# Patient Record
Sex: MALE | Race: OTHER | NOT HISPANIC OR LATINO | URBAN - METROPOLITAN AREA
[De-identification: names, ages, dates, MRNs, and addresses within clinical notes are randomized per-mention and may not be internally consistent; named-entity substitution may affect disease eponyms.]

---

## 2017-04-25 ENCOUNTER — OUTPATIENT (OUTPATIENT)
Dept: OUTPATIENT SERVICES | Facility: HOSPITAL | Age: 57
LOS: 1 days | Discharge: HOME | End: 2017-04-25

## 2017-06-28 DIAGNOSIS — J01.00 ACUTE MAXILLARY SINUSITIS, UNSPECIFIED: ICD-10-CM

## 2017-06-28 DIAGNOSIS — R53.83 OTHER FATIGUE: ICD-10-CM

## 2017-11-08 ENCOUNTER — OUTPATIENT (OUTPATIENT)
Dept: OUTPATIENT SERVICES | Facility: HOSPITAL | Age: 57
LOS: 1 days | Discharge: HOME | End: 2017-11-08

## 2017-11-08 DIAGNOSIS — G93.3 POSTVIRAL AND RELATED FATIGUE SYNDROMES: ICD-10-CM

## 2017-11-08 DIAGNOSIS — D53.9 NUTRITIONAL ANEMIA, UNSPECIFIED: ICD-10-CM

## 2019-07-29 ENCOUNTER — OUTPATIENT (OUTPATIENT)
Dept: OUTPATIENT SERVICES | Facility: HOSPITAL | Age: 59
LOS: 1 days | Discharge: HOME | End: 2019-07-29

## 2019-07-29 DIAGNOSIS — E78.5 HYPERLIPIDEMIA, UNSPECIFIED: ICD-10-CM

## 2019-07-29 DIAGNOSIS — D64.9 ANEMIA, UNSPECIFIED: ICD-10-CM

## 2022-05-23 ENCOUNTER — FORM ENCOUNTER (OUTPATIENT)
Age: 62
End: 2022-05-23

## 2022-05-23 ENCOUNTER — LABORATORY RESULT (OUTPATIENT)
Age: 62
End: 2022-05-23

## 2022-05-23 ENCOUNTER — APPOINTMENT (OUTPATIENT)
Dept: OTHER | Facility: CLINIC | Age: 62
End: 2022-05-23
Payer: COMMERCIAL

## 2022-05-23 VITALS
DIASTOLIC BLOOD PRESSURE: 95 MMHG | BODY MASS INDEX: 32.51 KG/M2 | WEIGHT: 240 LBS | SYSTOLIC BLOOD PRESSURE: 137 MMHG | HEART RATE: 63 BPM | OXYGEN SATURATION: 100 % | TEMPERATURE: 98.1 F | HEIGHT: 72 IN

## 2022-05-23 DIAGNOSIS — Z12.2 ENCOUNTER FOR SCREENING FOR MALIGNANT NEOPLASM OF RESPIRATORY ORGANS: ICD-10-CM

## 2022-05-23 DIAGNOSIS — Z03.89 ENCOUNTER FOR OBSERVATION FOR OTHER SUSPECTED DISEASES AND CONDITIONS RULED OUT: ICD-10-CM

## 2022-05-23 DIAGNOSIS — Z23 ENCOUNTER FOR IMMUNIZATION: ICD-10-CM

## 2022-05-23 PROBLEM — Z00.00 ENCOUNTER FOR PREVENTIVE HEALTH EXAMINATION: Status: ACTIVE | Noted: 2022-05-23

## 2022-05-23 PROCEDURE — 99386 PREV VISIT NEW AGE 40-64: CPT | Mod: 25

## 2022-05-23 RX ORDER — PANTOPRAZOLE 40 MG/1
40 TABLET, DELAYED RELEASE ORAL
Qty: 30 | Refills: 0 | Status: ACTIVE | COMMUNITY
Start: 2022-05-12

## 2022-05-24 ENCOUNTER — NON-APPOINTMENT (OUTPATIENT)
Age: 62
End: 2022-05-24

## 2022-05-24 LAB
ALBUMIN SERPL ELPH-MCNC: 4.6 G/DL
ALP BLD-CCNC: 105 U/L
ALT SERPL-CCNC: 16 U/L
ANION GAP SERPL CALC-SCNC: 11 MMOL/L
APPEARANCE: CLEAR
AST SERPL-CCNC: 19 U/L
BACTERIA: NEGATIVE
BASOPHILS # BLD AUTO: 0.06 K/UL
BASOPHILS NFR BLD AUTO: 0.8 %
BILIRUB SERPL-MCNC: 0.5 MG/DL
BILIRUBIN URINE: NEGATIVE
BLOOD URINE: NEGATIVE
BUN SERPL-MCNC: 20 MG/DL
CALCIUM SERPL-MCNC: 9.7 MG/DL
CHLORIDE SERPL-SCNC: 104 MMOL/L
CHOLEST SERPL-MCNC: 160 MG/DL
CO2 SERPL-SCNC: 25 MMOL/L
COLOR: YELLOW
CREAT SERPL-MCNC: 0.78 MG/DL
EGFR: 101 ML/MIN/1.73M2
EOSINOPHIL # BLD AUTO: 0.16 K/UL
EOSINOPHIL NFR BLD AUTO: 2.2 %
GLUCOSE QUALITATIVE U: NEGATIVE
GLUCOSE SERPL-MCNC: 84 MG/DL
HCT VFR BLD CALC: 39.1 %
HDLC SERPL-MCNC: 66 MG/DL
HGB BLD-MCNC: 10.7 G/DL
HYALINE CASTS: 2 /LPF
IMM GRANULOCYTES NFR BLD AUTO: 0.3 %
KETONES URINE: NEGATIVE
LDLC SERPL CALC-MCNC: 76 MG/DL
LEUKOCYTE ESTERASE URINE: NEGATIVE
LYMPHOCYTES # BLD AUTO: 1.62 K/UL
LYMPHOCYTES NFR BLD AUTO: 22.1 %
MAN DIFF?: NORMAL
MCHC RBC-ENTMCNC: 21.1 PG
MCHC RBC-ENTMCNC: 27.4 GM/DL
MCV RBC AUTO: 77.3 FL
MICROSCOPIC-UA: NORMAL
MONOCYTES # BLD AUTO: 0.61 K/UL
MONOCYTES NFR BLD AUTO: 8.3 %
NEUTROPHILS # BLD AUTO: 4.85 K/UL
NEUTROPHILS NFR BLD AUTO: 66.3 %
NITRITE URINE: NEGATIVE
NONHDLC SERPL-MCNC: 94 MG/DL
PH URINE: 6
PLATELET # BLD AUTO: 371 K/UL
POTASSIUM SERPL-SCNC: 5.3 MMOL/L
PROT SERPL-MCNC: 7.3 G/DL
PROTEIN URINE: NORMAL
RBC # BLD: 5.06 M/UL
RBC # FLD: 22.5 %
RED BLOOD CELLS URINE: 4 /HPF
SODIUM SERPL-SCNC: 141 MMOL/L
SPECIFIC GRAVITY URINE: 1.03
SQUAMOUS EPITHELIAL CELLS: 1 /HPF
TRIGL SERPL-MCNC: 93 MG/DL
UROBILINOGEN URINE: NORMAL
WBC # FLD AUTO: 7.32 K/UL
WHITE BLOOD CELLS URINE: 1 /HPF

## 2022-05-26 ENCOUNTER — NON-APPOINTMENT (OUTPATIENT)
Age: 62
End: 2022-05-26

## 2022-05-26 DIAGNOSIS — F17.210 NICOTINE DEPENDENCE, CIGARETTES, UNCOMPLICATED: ICD-10-CM

## 2022-05-28 VITALS — BODY MASS INDEX: 32.51 KG/M2 | HEIGHT: 72 IN | WEIGHT: 240 LBS

## 2022-05-28 PROBLEM — F17.210 CIGARETTE SMOKER: Status: ACTIVE | Noted: 2022-05-23

## 2022-05-28 NOTE — HISTORY OF PRESENT ILLNESS
[Current] : current smoker [_____ pack-years] : [unfilled] pack-years [TextBox_13] : Referred by Dr. Chin\par \par Mr. EUSEBIO FREDERICK is a 61 year old man with a history of nicotine dependence\par \par He was seen in the office by Dr. Chin for review of eligibility for, as well as, discussion of Low-Dose CT lung cancer screening program. Over the telephone today we reviewed and confirmed that the patient meets screening eligibility criteria:\par \par -Age: 61 years old\par \par Smoking status:\par \par -Current smoker\par \par -Number of pack(s) per day: .75\par \par -Number of years smoked:37\par \par -Number of pack years smokin.75\par \par Mr. FREDERICK denies any signs or symptoms of lung cancer including new cough, change in cough, hemoptysis and unintentional weight loss.\par \par Mr. FREDERICK denies any personal history of lung cancer. No lung cancer in a 1st degree relative. Denies any history of lung disease. Denies any history of occupational exposures\par  [TextBox_6] : .75 [TextBox_8] : 37

## 2022-05-28 NOTE — PLAN
[Smoking Cessation Guidance Provided] : Smoking cessation guidance was provided to patient [Smoking Cessation] : smoking cessation [Age appropriate screenings] : Age appropriate screenings [Regular follow-up with healthcare provider] : regular follow-up with healthcare provider [FreeTextEntry1] : Plan:\par \par -Low Dose CT chest for lung cancer screening scheduled for 6/2/22 at [facility]\par \par -Patient to follow up with Dr. Chin to review results of LDCT\par \par -Encouraged smoking cessation\par \par \par Engaged in shared decision making with Mr. EUSEBIO FREDERICK . Answered all questions. He verbalized understanding and agreement. He knows to call back with any questions or concerns\par

## 2022-05-28 NOTE — REASON FOR VISIT
[Home] : at home, [unfilled] , at the time of the visit. [Medical Office: ([facility])___] : at the medical office located in  [Verbal consent obtained from patient] : the patient, [unfilled] [Initial Evaluation] : an initial evaluation visit [Review of Eligibility] : review of eligibility [Low-Dose CT Screening Discussion] : low-dose CT lung cancer screening discussion [Virtual Visit] : virtual visit

## 2022-06-02 ENCOUNTER — OUTPATIENT (OUTPATIENT)
Dept: OUTPATIENT SERVICES | Facility: HOSPITAL | Age: 62
LOS: 1 days | End: 2022-06-02
Payer: COMMERCIAL

## 2022-06-02 ENCOUNTER — APPOINTMENT (OUTPATIENT)
Dept: CT IMAGING | Facility: IMAGING CENTER | Age: 62
End: 2022-06-02
Payer: COMMERCIAL

## 2022-06-02 DIAGNOSIS — Z12.2 ENCOUNTER FOR SCREENING FOR MALIGNANT NEOPLASM OF RESPIRATORY ORGANS: ICD-10-CM

## 2022-06-02 PROCEDURE — 71271 CT THORAX LUNG CANCER SCR C-: CPT | Mod: 26

## 2022-06-02 PROCEDURE — 71271 CT THORAX LUNG CANCER SCR C-: CPT

## 2022-06-03 PROBLEM — Z00.00 ENCOUNTER FOR PREVENTIVE HEALTH EXAMINATION: Noted: 2022-06-03

## 2022-06-07 VITALS — BODY MASS INDEX: 32.51 KG/M2 | WEIGHT: 240 LBS | HEIGHT: 72 IN

## 2022-06-08 ENCOUNTER — APPOINTMENT (OUTPATIENT)
Dept: UROLOGY | Facility: CLINIC | Age: 62
End: 2022-06-08
Payer: COMMERCIAL

## 2022-06-08 DIAGNOSIS — Z80.42 FAMILY HISTORY OF MALIGNANT NEOPLASM OF PROSTATE: ICD-10-CM

## 2022-06-08 DIAGNOSIS — Z80.0 FAMILY HISTORY OF MALIGNANT NEOPLASM OF DIGESTIVE ORGANS: ICD-10-CM

## 2022-06-08 DIAGNOSIS — Z78.9 OTHER SPECIFIED HEALTH STATUS: ICD-10-CM

## 2022-06-08 DIAGNOSIS — C61 MALIGNANT NEOPLASM OF PROSTATE: ICD-10-CM

## 2022-06-08 PROCEDURE — 99204 OFFICE O/P NEW MOD 45 MIN: CPT | Mod: 95

## 2022-06-08 NOTE — DISEASE MANAGEMENT
[I] : I [Active Surveillance] : Active Surveillance [Biopsy] : Patient had a biopsy on [6] : Template Biopsy Tomas Score: 6 [BiopsyDate] : 06/04/2021 [MeasuredProstateVolume] : 23 [TotalCores] : 14 [TotalPositiveCores] : 3 [MaxCoreInvolvement] : 35 [LastPSADate] : 5/10/22 [LastPSAResults] : 1.46 ng/ml [LastMRIDate] : 05/17/22 [LastMRIResults] : 23 cc prostate with PIRADS 4 lesion to the right posterolateral midgland peripheral zone measuring 7 x 4 mm (image 16 of series 8). [FirstSurveillanceBiopsyDate] : 06/04/21 [FirstSurveillanceBiopsyResults] : Right medial apex- [place] 6 (3+3) 5% of tissue, 1 mm \par Left medial apex- [place] 6 (3+3) 35% of tissue, 4 mm\par Left anterior transition zone midgland- Tomas 6 (3+3) 2% of tissue, 1 mm \par Left medial base- atypical small acinar proliferation

## 2022-06-08 NOTE — PHYSICAL EXAM
[Oriented To Time, Place, And Person] : oriented to person, place, and time [FreeTextEntry1] : 61 year old male with a PMHx of CaP on AS who presents for prostate biopsy consult. A biopsy done on 06/04/2021 detected GG1 [place] 6 CaP in 3/14 cores. His PSA at diagnosis was 1.33 ng/ml. His current PSA is 1.46 ng/ml and his PSAD is normal (0.06 ng/ml/cc). An MRI done on 05/17/2022 demonstrated a PIRADS 4 lesion to the right posterolateral midgland peripheral zone. He has not had any other prostate biopsies. Family history of prostate cancer in his father- was treated with radiation therapy and he did not die from prostate cancer. \par \par 1. Obtain MRI images and report from 03/03/2021. \par \par He understands that many men with prostate cancer will die with the disease rather than of it and we also discussed the results large multi-center American and  prostate cancer screening trials. He also understands that PSA in and of itself does not diagnose prostate cancer but only assesses risk to a certain degree. The patient understands that to definitively screen for prostate cancer, a biopsy is required and this procedure has risks, including bleeding, infection, ED and urinary retention. The patient opted to move forward with the biopsy.\par \par The patient is aware to expect hematuria x 2 weeks and up to 4 weeks of hematospermia.  There is a risk of infection albeit much lower than a transrectal approach. In some cases, patients can experience erectile dysfunction but this is usually self limiting.  Any fever/chills after the biopsy, the patient is to contact the office and go to the ER for an immediate evaluation. He has been given paper instructions outlining these items - which includes medications to avoid prior to surgery.\par \par  \par 1. CBC, BMP, PSA, Covid Test, UA UCx, EKG. He had a CT Chest done last week- given current smoking status, he can submit these results for medical clearance in lieu of a CXR.\par 2. Medical Clearance\par 3. TP biopsy at [facility] with Dr. Casiano\par 4. Follow up 2 weeks after biopsy with his primary urologist or ourselves.\par 5. We will call with the path results once they are resulted.\par \par Follow up in office for MRI review/physical exam prior to biopsy date given.\par \par Annabel Son NP was present and available for consult for the entirety of this visit.\par

## 2022-06-08 NOTE — PHYSICAL EXAM
[Oriented To Time, Place, And Person] : oriented to person, place, and time [FreeTextEntry1] : 61 year old male with a PMHx of CaP on AS who presents for prostate biopsy consult. A biopsy done on 06/04/2021 detected GG1 [place] 6 CaP in 3/14 cores. His PSA at diagnosis was 1.33 ng/ml. His current PSA is 1.46 ng/ml and his PSAD is normal (0.06 ng/ml/cc). An MRI done on 05/17/2022 demonstrated a PIRADS 4 lesion to the right posterolateral midgland peripheral zone. He has not had any other prostate biopsies. Family history of prostate cancer in his father- was treated with radiation therapy and he did not die from prostate cancer. \par \par 1. Obtain MRI images and report from 03/03/2021. \par \par He understands that many men with prostate cancer will die with the disease rather than of it and we also discussed the results large multi-center American and  prostate cancer screening trials. He also understands that PSA in and of itself does not diagnose prostate cancer but only assesses risk to a certain degree. The patient understands that to definitively screen for prostate cancer, a biopsy is required and this procedure has risks, including bleeding, infection, ED and urinary retention. The patient opted to move forward with the biopsy.\par \par The patient is aware to expect hematuria x 2 weeks and up to 4 weeks of hematospermia.  There is a risk of infection albeit much lower than a transrectal approach. In some cases, patients can experience erectile dysfunction but this is usually self limiting.  Any fever/chills after the biopsy, the patient is to contact the office and go to the ER for an immediate evaluation. He has been given paper instructions outlining these items - which includes medications to avoid prior to surgery.\par \par  \par 1. CBC, BMP, PSA, Covid Test, UA UCx, EKG. He had a CT Chest done last week- given current smoking status, he can submit these results for medical clearance in lieu of a CXR.\par 2. Medical Clearance\par 3. TP biopsy at [facility] with Dr. Casiano\par 4. Follow up 2 weeks after biopsy with his primary urologist or ourselves.\par 5. We will call with the path results once they are resulted.\par \par Follow up in office for MRI review/physical exam prior to biopsy date given.\par \par Annabel Son NP was present and available for consult for the entirety of this visit.\par

## 2022-06-08 NOTE — HISTORY OF PRESENT ILLNESS
[FreeTextEntry1] : AMWELL FAILED\par SALVAGED BY TELEPHONE\par \par Dear Dr. Portillo (Urologist)\par Dear Dr. Ramon (Radiation Oncologist)\par Dear Dr. Rahul Currie (PCP)\par \par Thank you so much for the referral to help care for your patient.\par \par Chief Complaint: Prostate Cancer\par Date of first visit: 06/08/2022\par Occupation: \par \par EUSEBIO FREDERICK  is a 61 year old male with a PMHx of CaP on AS who presents for prostate biopsy consult. A biopsy done on 06/04/2021 detected GG1 [place] 6 CaP in 3/14 cores. His PSA at diagnosis was 1.33 ng/ml. His current PSA is 1.46 ng/ml and his PSAD is normal (0.06 ng/ml/cc). An MRI done on 05/17/2022 demonstrated a PIRADS 4 lesion to the right posterolateral midgland peripheral zone. He has not had any other prostate biopsies. His father was diagnosed with prostate cancer in his 70s and was treated with radiation therapy--he did not die from prostate cancer. \par \par He has no urinary complaints at this time. \par \par PSA History\par 1.46 ng/ml 05/10/22\par 1.33 ng/ml 02/08/22\par \par MRI History\par MRI 05/17/2022.  23 cc prostate with PIRADS 4 lesion to the right posterolateral midgland peripheral zone measuring 7 x 4 mm (image 16 of series 8).  No LAD No EPE, No Bony Lesions.  The clinical implications discussed with the patient.\par \par MRI 03/03/2021(per chart review)  \par PIRADS 3 lesion to the left anterior transition zone at the midgland\par PIRADS 3 lesion measuring 0.8 cm to the left anterolateral peripheral zone.  No LAD No EPE, No Bony Lesions.  The images have been reviewed and clinical implications discussed with the patient.\par \par Biopsy History \par 06/04/2021 at [facility] w/ Dr. Watson\par Right medial apex- [place] 6 (3+3) 5% of tissue, 1 mm \par Left medial apex- [place] 6 (3+3) 35% of tissue, 4 mm\par Left anterior transition zone midgland- Tomas 6 (3+3) 2% of tissue, 1 mm \par Left medial base- atypical small acinar proliferation\par \par The patient denies fevers, chills, nausea and or vomiting and no unexplained weight loss.\par \par All pertinent laboratory results and physician notes were reviewed.  Questionnaire results were discussed with patient.\par \par 06/08/2022\par IPSS 1 QOL 0\par JOSELYN 22

## 2022-06-08 NOTE — HISTORY OF PRESENT ILLNESS
[FreeTextEntry1] : AMWELL FAILED\par SALVAGED BY TELEPHONE\par \par Dear Dr. Portillo (Urologist)\par Dear Dr. Ramon (Radiation Oncologist)\par Dear Dr. Rahul Currie (PCP)\par \par Thank you so much for the referral to help care for your patient.\par \par Chief Complaint: Prostate Cancer\par Date of first visit: 06/08/2022\par Occupation: \par \par EUSEBIO FREDERICK  is a 61 year old male with a PMHx of CaP on AS who presents for prostate biopsy consult. A biopsy done on 06/04/2021 detected GG1 [place] 6 CaP in 3/14 cores. His PSA at diagnosis was 1.33 ng/ml. His current PSA is 1.46 ng/ml and his PSAD is normal (0.06 ng/ml/cc). An MRI done on 05/17/2022 demonstrated a PIRADS 4 lesion to the right posterolateral midgland peripheral zone. He has not had any other prostate biopsies. His father was diagnosed with prostate cancer in his 70s and was treated with radiation therapy--he did not die from prostate cancer. \par \par He has no urinary complaints at this time. \par \par PSA History\par 1.46 ng/ml 05/10/22\par 1.33 ng/ml 02/08/22\par \par MRI History\par MRI 05/17/2022.  23 cc prostate with PIRADS 4 lesion to the right posterolateral midgland peripheral zone measuring 7 x 4 mm (image 16 of series 8).  No LAD No EPE, No Bony Lesions.  The clinical implications discussed with the patient.\par \par MRI 03/03/2021(per chart review)  \par PIRADS 3 lesion to the left anterior transition zone at the midgland\par PIRADS 3 lesion measuring 0.8 cm to the left anterolateral peripheral zone.  No LAD No EPE, No Bony Lesions.  The images have been reviewed and clinical implications discussed with the patient.\par \par Biopsy History \par 06/04/2021 at [facility] w/ Dr. Watson\par Right medial apex- [place] 6 (3+3) 5% of tissue, 1 mm \par Left medial apex- [place] 6 (3+3) 35% of tissue, 4 mm\par Left anterior transition zone midgland- [place] 6 (3+3) 2% of tissue, 1 mm \par Left medial base- atypical small acinar proliferation\par \par The patient denies fevers, chills, nausea and or vomiting and no unexplained weight loss.\par \par All pertinent laboratory results and physician notes were reviewed.  Questionnaire results were discussed with patient.\par \par 06/08/2022\par IPSS 1 QOL 0\par JOSELYN 22

## 2022-06-14 ENCOUNTER — FORM ENCOUNTER (OUTPATIENT)
Age: 62
End: 2022-06-14

## 2022-06-21 ENCOUNTER — APPOINTMENT (OUTPATIENT)
Dept: UROLOGY | Facility: CLINIC | Age: 62
End: 2022-06-21

## 2022-06-21 ENCOUNTER — APPOINTMENT (OUTPATIENT)
Dept: OTHER | Facility: CLINIC | Age: 62
End: 2022-06-21

## 2022-06-21 ENCOUNTER — APPOINTMENT (OUTPATIENT)
Dept: OTHER | Facility: CLINIC | Age: 62
End: 2022-06-21
Payer: COMMERCIAL

## 2022-06-21 VITALS
TEMPERATURE: 98 F | DIASTOLIC BLOOD PRESSURE: 96 MMHG | SYSTOLIC BLOOD PRESSURE: 159 MMHG | HEART RATE: 66 BPM | OXYGEN SATURATION: 100 %

## 2022-06-21 PROCEDURE — 99214 OFFICE O/P EST MOD 30 MIN: CPT

## 2022-06-21 PROCEDURE — 99441: CPT | Mod: 95

## 2022-06-24 ENCOUNTER — NON-APPOINTMENT (OUTPATIENT)
Age: 62
End: 2022-06-24

## 2022-06-24 LAB
BACTERIA UR CULT: NORMAL
SARS-COV-2 N GENE NPH QL NAA+PROBE: NOT DETECTED

## 2022-06-24 NOTE — HISTORY OF PRESENT ILLNESS
[FreeTextEntry1] : Language: English\par Date of First visit: \par Accompanied by: Self\par Contact info: \par Referring Provider/PCP: \par Dr. Portillo (Urologist)\par Dear Dr. Ramon (Radiation Oncologist)\par Dear Dr. Rahul Currie (PCP)\par \par \par \par CC/ Problem List:\par \par ===============================================================================\par FIRST VISIT:\par The patient is a 61 year male who first presents 06/21/2022 for RDP prostate biopsy while on AS for prostate cancer\par \par Diagnosis: \par Biopsy: Patient had a biopsy on 06/04/2021. Measured Prostate Volume (mL): 23. Total number of cores at biopsy: 14. Total # of positives cores: 3. Max % Core Involvement: 35. \par Template Biopsy Tomas Score: 6. \par \par AJCC Staging: \par AJCC Stage (8th Ed): I. \par \par Treatment: \par Primary Treatment: Active Surveillance. \par Active Surveillance: . \par Last PSA was on: 5/10/22 Last PSA Result: 1.46 ng/ml. \par Last MRI was on: 05/17/22 Last MRI Result: 23 cc prostate with PIRADS 4 lesion to the right posterolateral midgland peripheral zone measuring 7 x 4 mm (image 16 of series 8). \par First Surveillance Biopsy was on: 06/04/21 First Surveillance Biopsy Result: Right medial apex- [place] 6 (3+3) 5% of tissue, 1 mm \par Left medial apex- [place] 6 (3+3) 35% of tissue, 4 mm\par Left anterior transition zone midgland- [place] 6 (3+3) 2% of tissue, 1 mm \par Left medial base- atypical small acinar proliferation. \par \par \par -------------------------------------------------------------------------------------------\par INTERVAL VISITS:\par \par \par ===============================================================================\par \par PMH: Denies\par PSH: Prostate biopsy, [place], Gastric bypass, Hand surgery; no metal no pacemaker\par POBH: (if applicable)\par FH: \par \par ALL: NKDA\par MEDS: Protonix\par SOC: Quit Tob 10 years and started again around 2017, Social EtOH, denies drugs\par \par \par ROS: Review of Systems is as per HPI unless otherwise denoted below\par \par \par ===============================================================================\par DATA: \par \par LABS:-------------------------------------------------------------------------------------------------------------------\par PSA History\par 1.46 ng/ml 05/10/22\par 1.33 ng/ml 02/08/22\par \par \par \par RADS:-------------------------------------------------------------------------------------------------------------------\par MRI 03/03/2021(per chart review) \par PIRADS 3 lesion to the left anterior transition zone at the midgland\par PIRADS 3 lesion measuring 0.8 cm to the left anterolateral peripheral zone. No LAD No EPE, No Bony Lesions. The images have been reviewed and clinical implications discussed with the patient.\par \par MRI History\par MRI 05/17/2022. 23 cc prostate with PIRADS 4 lesion to the right posterolateral midgland peripheral zone measuring 7 x 4 mm (image 16 of series 8). No LAD No EPE, No Bony Lesions. The clinical implications discussed with the patient.\par \par \par PATHOLOGY/CYTOLOGY:-------------------------------------------------------------------------------------------\par Biopsy History \par 06/04/2021 at [facility] w/ Dr. Watson\par Right medial apex- [place] 6 (3+3) 5% of tissue, 1 mm \par Left medial apex- [place] 6 (3+3) 35% of tissue, 4 mm\par Left anterior transition zone midgland- [place] 6 (3+3) 2% of tissue, 1 mm \par Left medial base- atypical small acinar proliferation\par \par \par VOIDING STUDIES: ----------------------------------------------------------------------------------------------------\par 6/21/2022: PVR 0cc\par \par \par STONE STUDIES: (Analysis/LLSA)----------------------------------------------------------------------------------\par \par \par \par PROCEDURES: -----------------------------------------------------------------------------------------------\par \par \par \par \par ===============================================================================\par \par PHYSICAL EXAM:\par \par GEN: AAOx3, NAD; Habitus: OW\par \par BARRIERS to CARE: None\par \par PSYCH: Appropriate Behavior, Affect Congruent\par \par HEENT: AT/NC Trachea midline. EOMI.\par \par Lungs: No labored breathing.\par \par NEURO: + Movement, all 4 extremities grossly intact without deficits. No tremors.\par \par SKIN: Warm dry. No visible rashes or ulcers\par \par GAIT: Gait normal, Stability good\par \par =======================================================================================\par \par \par \par \par ASSESSMENT and PLAN\par \par The patient is a 61 year male with a history of the following:\par \par 1. Prostate cancer on AS here for surveillance prostaet biopsy\par \par Because of the patient's clinical situation we decided to set the patient up for a Transperineal Fusion prostate biopsy. This is done in the operating room under anesthesia.\par \par The patient understands that the risks of this procedure include bleeding (hematuria, hematospermia, blood in stool or per rectum), infection, difficulty voiding/inability to urinate, fever, and, with repeated biopsies, problems with erections.  He understands that if he can not void after the biopsy he will go home with a fragoso. \par \par He was given and understands the biopsy prep:\par \par a. Fleet's enema on the morning of your biopsy.\par \par b. Avoid blood thinners, fish oil and supplemental Vitamin E. He can stay on baby ASA if he takes it.\par \par c. He was given information regarding blood thinners if he is on them. \par \par \par \par -----------------------------------------------------------------------------------------------------\par LABS/TESTS Ordered: UCx\par Meds Ordered:\par Follow up: Surgery\par -----------------------------------------------------------------------------------------------------\par \par Greater than 50% of this 30 minute visit was spent counseling the patient and coordinating care.\par \par Thank you for allowing me to assist in the care of your patient. Should you have any questions please do not hesitate to reach out to me.\par \par \par Karla Casiano MD\par Associate \[facility]\par Phone: [phone]\par Fax: [phone]\[facility] \par [address]\[facility] [address]\[facility]

## 2022-06-29 ENCOUNTER — OUTPATIENT (OUTPATIENT)
Dept: OUTPATIENT SERVICES | Facility: HOSPITAL | Age: 62
LOS: 1 days | End: 2022-06-29
Payer: COMMERCIAL

## 2022-06-29 ENCOUNTER — NON-APPOINTMENT (OUTPATIENT)
Age: 62
End: 2022-06-29

## 2022-06-29 DIAGNOSIS — R97.20 ELEVATED PROSTATE SPECIFIC ANTIGEN [PSA]: ICD-10-CM

## 2022-06-29 PROCEDURE — 76377 3D RENDER W/INTRP POSTPROCES: CPT | Mod: 26

## 2022-06-29 PROCEDURE — C8001: CPT

## 2022-07-05 ENCOUNTER — FORM ENCOUNTER (OUTPATIENT)
Age: 62
End: 2022-07-05

## 2022-07-05 ENCOUNTER — TRANSCRIPTION ENCOUNTER (OUTPATIENT)
Age: 62
End: 2022-07-05

## 2022-07-05 NOTE — ASU PATIENT PROFILE, ADULT - BLOOD TRANSFUSION, PREVIOUS, PROFILE
remember. Skip the missed dose if it is almost time for your next scheduled dose. Do not take extra medicine to make up the missed dose. What happens if I overdose? Seek emergency medical attention or call the Poison Help line at [phone]. Overdose can cause nausea, vomiting, stomach pain, diarrhea, skin rash, drowsiness, hyperactivity, and decreased urination. What should I avoid while taking amoxicillin and clavulanate potassium? Avoid taking this medicine together with or just after eating a high-fat meal. This will make it harder for your body to absorb the medication. Antibiotic medicines can cause diarrhea, which may be a sign of a new infection. If you have diarrhea that is watery or bloody, call your doctor. Do not use anti-diarrhea medicine unless your doctor tells you to. What are the possible side effects of amoxicillin and clavulanate potassium? Get emergency medical help if you have signs of an allergic reaction: hives; difficult breathing; swelling of your face, lips, tongue, or throat. Call your doctor at once if you have:  · severe stomach pain, diarrhea that is watery or bloody;  · pale or yellowed skin, dark colored urine, fever, confusion or weakness;  · loss of appetite, upper stomach pain, jaundice (yellowing of the skin or eyes);  · easy bruising or bleeding;  · little or no urination; or  · severe skin reaction --fever, sore throat, swelling in your face or tongue, burning in your eyes, skin pain followed by a red or purple skin rash that spreads (especially in the face or upper body) and causes blistering and peeling. Common side effects may include:  · nausea, diarrhea; or  · vaginal itching or discharge; This is not a complete list of side effects and others may occur. Call your doctor for medical advice about side effects. You may report side effects to FDA at 0-463-FDA-7724. What other drugs will affect amoxicillin and clavulanate potassium?   Tell your doctor about all
no

## 2022-07-06 ENCOUNTER — OUTPATIENT (OUTPATIENT)
Dept: OUTPATIENT SERVICES | Facility: HOSPITAL | Age: 62
LOS: 1 days | Discharge: HOME | End: 2022-07-06

## 2022-07-06 ENCOUNTER — TRANSCRIPTION ENCOUNTER (OUTPATIENT)
Age: 62
End: 2022-07-06

## 2022-07-06 ENCOUNTER — APPOINTMENT (OUTPATIENT)
Dept: UROLOGY | Facility: AMBULATORY SURGERY CENTER | Age: 62
End: 2022-07-06

## 2022-07-06 ENCOUNTER — RESULT REVIEW (OUTPATIENT)
Age: 62
End: 2022-07-06

## 2022-07-06 VITALS
OXYGEN SATURATION: 97 % | SYSTOLIC BLOOD PRESSURE: 129 MMHG | HEART RATE: 60 BPM | RESPIRATION RATE: 14 BRPM | HEIGHT: 72 IN | DIASTOLIC BLOOD PRESSURE: 77 MMHG | TEMPERATURE: 97 F | WEIGHT: 254.41 LBS

## 2022-07-06 VITALS
RESPIRATION RATE: 16 BRPM | SYSTOLIC BLOOD PRESSURE: 113 MMHG | OXYGEN SATURATION: 97 % | DIASTOLIC BLOOD PRESSURE: 67 MMHG | HEART RATE: 50 BPM | TEMPERATURE: 98 F

## 2022-07-06 LAB — SARS-COV-2 N GENE NPH QL NAA+PROBE: NOT DETECTED

## 2022-07-06 PROCEDURE — 88305 TISSUE EXAM BY PATHOLOGIST: CPT | Mod: 26

## 2022-07-06 PROCEDURE — 55706 BX PRST8 NDL SAT SAMPLING: CPT

## 2022-07-06 RX ORDER — PANTOPRAZOLE SODIUM 20 MG/1
1 TABLET, DELAYED RELEASE ORAL
Qty: 0 | Refills: 0 | DISCHARGE

## 2022-07-06 RX ORDER — SODIUM CHLORIDE 9 MG/ML
1000 INJECTION, SOLUTION INTRAVENOUS
Refills: 0 | Status: DISCONTINUED | OUTPATIENT
Start: 2022-07-06 | End: 2022-07-06

## 2022-07-06 RX ORDER — ONDANSETRON 8 MG/1
4 TABLET, FILM COATED ORAL EVERY 4 HOURS
Refills: 0 | Status: DISCONTINUED | OUTPATIENT
Start: 2022-07-06 | End: 2022-07-06

## 2022-07-06 RX ORDER — ACETAMINOPHEN 500 MG
650 TABLET ORAL EVERY 6 HOURS
Refills: 0 | Status: DISCONTINUED | OUTPATIENT
Start: 2022-07-06 | End: 2022-07-06

## 2022-07-06 RX ORDER — HYDROMORPHONE HYDROCHLORIDE 2 MG/ML
0.5 INJECTION INTRAMUSCULAR; INTRAVENOUS; SUBCUTANEOUS
Refills: 0 | Status: DISCONTINUED | OUTPATIENT
Start: 2022-07-06 | End: 2022-07-06

## 2022-07-06 RX ORDER — FENTANYL CITRATE 50 UG/ML
25 INJECTION INTRAVENOUS
Refills: 0 | Status: DISCONTINUED | OUTPATIENT
Start: 2022-07-06 | End: 2022-07-06

## 2022-07-06 RX ADMIN — SODIUM CHLORIDE 75 MILLILITER(S): 9 INJECTION, SOLUTION INTRAVENOUS at 14:19

## 2022-07-06 NOTE — BRIEF OPERATIVE NOTE - NSICDXBRIEFPROCEDURE_GEN_ALL_CORE_FT
PROCEDURES:  Transperineal template-guided mapping biopsy of prostate 06-Jul-2022 13:46:41  Karla Casiano

## 2022-07-06 NOTE — ASU DISCHARGE PLAN (ADULT/PEDIATRIC) - NS MD DC FALL RISK RISK
For information on Fall & Injury Prevention, visit: [email or web address] OR  [email or web address] OR  https://www.cdc.gov/steadi/patient.html

## 2022-07-06 NOTE — ASU DISCHARGE PLAN (ADULT/PEDIATRIC) - CARE PROVIDER_API CALL
Karla Casiano)  Urology  [address] [place] [id number]  Phone: [phone]  Fax: [phone]  Follow Up Time:

## 2022-07-06 NOTE — ASU DISCHARGE PLAN (ADULT/PEDIATRIC) - ASU DC SPECIAL INSTRUCTIONSFT
Please see pre-printed instructions Please see pre-printed instructions    Urine, semen, and stool may be bloody. Hydrate well to keep urine a clear color.

## 2022-07-07 ENCOUNTER — NON-APPOINTMENT (OUTPATIENT)
Age: 62
End: 2022-07-07

## 2022-07-08 ENCOUNTER — NON-APPOINTMENT (OUTPATIENT)
Age: 62
End: 2022-07-08

## 2022-07-08 LAB — SURGICAL PATHOLOGY STUDY: SIGNIFICANT CHANGE UP

## 2022-07-25 ENCOUNTER — NON-APPOINTMENT (OUTPATIENT)
Age: 62
End: 2022-07-25

## 2022-07-25 ENCOUNTER — LABORATORY RESULT (OUTPATIENT)
Age: 62
End: 2022-07-25

## 2022-07-28 ENCOUNTER — APPOINTMENT (OUTPATIENT)
Dept: PULMONOLOGY | Facility: CLINIC | Age: 62
End: 2022-07-28

## 2022-07-28 VITALS
OXYGEN SATURATION: 99 % | HEIGHT: 70 IN | WEIGHT: 240 LBS | HEART RATE: 68 BPM | DIASTOLIC BLOOD PRESSURE: 80 MMHG | SYSTOLIC BLOOD PRESSURE: 120 MMHG | BODY MASS INDEX: 34.36 KG/M2 | TEMPERATURE: 97.1 F

## 2022-07-28 DIAGNOSIS — J44.9 CHRONIC OBSTRUCTIVE PULMONARY DISEASE, UNSPECIFIED: ICD-10-CM

## 2022-07-28 PROCEDURE — 94726 PLETHYSMOGRAPHY LUNG VOLUMES: CPT

## 2022-07-28 PROCEDURE — 94010 BREATHING CAPACITY TEST: CPT

## 2022-07-28 PROCEDURE — ZZZZZ: CPT

## 2022-07-28 PROCEDURE — 99204 OFFICE O/P NEW MOD 45 MIN: CPT | Mod: 25

## 2022-07-28 PROCEDURE — 94729 DIFFUSING CAPACITY: CPT

## 2022-07-30 PROBLEM — Z78.9 OTHER SPECIFIED HEALTH STATUS: Chronic | Status: ACTIVE | Noted: 2022-07-06

## 2022-08-01 ENCOUNTER — FORM ENCOUNTER (OUTPATIENT)
Age: 62
End: 2022-08-01

## 2022-08-01 PROBLEM — J44.9 COPD (CHRONIC OBSTRUCTIVE PULMONARY DISEASE): Status: ACTIVE | Noted: 2022-08-01

## 2022-08-01 NOTE — COUNSELING
[Cessation strategies including cessation program discussed] : Cessation strategies including cessation program discussed [Use of nicotine replacement therapies and other medications discussed] : Use of nicotine replacement therapies and other medications discussed [Smoking Cessation Program Referral] : Smoking Cessation Program Referral  [Yes] : Willing to quit smoking [FreeTextEntry3] : 13

## 2022-08-01 NOTE — HISTORY OF PRESENT ILLNESS
[Current] : current [< 20 pack-years] : < 20 pack-years [Never] : never [Lung Cancer Screening] : Patient underwent lung cancer screening [1] : 1 [TextBox_4] : This is a 61-year-old male with significant past medical history of [place] exposure and former tobacco use who comes in from the [employer] program.  Patient indicates that he has no significant complaints in terms of his breathing patterns.  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  Does admit to periodic smoking with long stretches of abstinence.  Given his history of [place] exposure and smoking history who was referred to me for evaluation. [TextBox_11] : 0.5 [Awakes Unrefreshed] : does not awaken unrefreshed [Difficulty Maintaining Sleep] : does not have difficulty maintaining sleep [Frequent Nocturnal Awakening] : denies frequent nocturnal awakening [Hypnopompic Hallucinations] : denies hypnopompic hallucinations [Recent  Weight Gain] : no recent weight gain [Snoring] : no snoring [Tired while Driving] : not tired while driving [Unusual Movements] : no unusual movements [Witnessed Apneas] : no witnessed apneas [DIS] : does not have difficulty initiating sleep [DMS] : does not have difficulty maintaining sleep [TextBox_12] : 06/2022

## 2022-08-01 NOTE — PHYSICAL EXAM
[No Acute Distress] : no acute distress [Well Nourished] : well nourished [No Deformities] : no deformities [Normal Oropharynx] : normal oropharynx [II] : Mallampati Class: II [Normal Appearance] : normal appearance [No Neck Mass] : no neck mass [Normal Rate/Rhythm] : normal rate/rhythm [Normal S1, S2] : normal s1, s2 [No Murmurs] : no murmurs [No Resp Distress] : no resp distress [Clear to Auscultation Bilaterally] : clear to auscultation bilaterally [Benign] : benign [No Clubbing] : no clubbing [No Cyanosis] : no cyanosis [No Edema] : no edema [No Focal Deficits] : no focal deficits [Oriented x3] : oriented x3 [Normal Affect] : normal affect

## 2022-08-01 NOTE — ASSESSMENT
[FreeTextEntry1] : This is a 61-year-old male with significant past medical history of tobacco use of [place] exposure who comes in to [facility] care.\par \par #COPD–patient is currently not symptomatic in regards to his breathing.  Pulmonary function test today showed mild obstructive physiology with signs of air trapping in addition to normal functional alveolar units.  It is likely that he is able to overcome his obstructive physiology secondary to his cardiovascular fitness as his job entails a lot of manual labor.  He is advised to quit smoking.  Given his lack of symptoms there is no indication at this time for an inhaler.\par \par #Lung cancer screening–patient has undergone lung cancer screening and was not found to have any significant nodules.  He should repeat his testing yearly.\par \par Patient can follow-up with me in 1 year or sooner if needed.

## 2022-08-03 ENCOUNTER — FORM ENCOUNTER (OUTPATIENT)
Age: 62
End: 2022-08-03

## 2022-08-09 ENCOUNTER — FORM ENCOUNTER (OUTPATIENT)
Age: 62
End: 2022-08-09

## 2022-08-30 ENCOUNTER — FORM ENCOUNTER (OUTPATIENT)
Age: 62
End: 2022-08-30

## 2022-08-30 ENCOUNTER — NON-APPOINTMENT (OUTPATIENT)
Age: 62
End: 2022-08-30

## 2022-09-06 ENCOUNTER — NON-APPOINTMENT (OUTPATIENT)
Age: 62
End: 2022-09-06

## 2022-09-06 ENCOUNTER — FORM ENCOUNTER (OUTPATIENT)
Age: 62
End: 2022-09-06

## 2022-10-03 ENCOUNTER — FORM ENCOUNTER (OUTPATIENT)
Age: 62
End: 2022-10-03

## 2022-10-06 NOTE — BRIEF OPERATIVE NOTE - URINE OUTPUT
PROCEDURES:  Exploratory laparotomy 06-Oct-2022 18:42:03  Faustino Kimball  Closure, fistula, enterovesical, with cystectomy 06-Oct-2022 18:42:41  Faustino Kimball  Sigmoidoscopy 06-Oct-2022 18:43:30  Faustino Kimball  Colon resection 06-Oct-2022 18:43:49  Faustino Kimball  
0
PROCEDURES:  Closure, cystotomy 06-Oct-2022 19:27:39  Melvin Martinez  Cystoscopic placement of bilateral ureteral stents 06-Oct-2022 19:27:49  Melvin Martinez

## 2022-10-31 ENCOUNTER — FORM ENCOUNTER (OUTPATIENT)
Age: 62
End: 2022-10-31

## 2023-01-19 ENCOUNTER — FORM ENCOUNTER (OUTPATIENT)
Age: 63
End: 2023-01-19

## 2023-04-25 ENCOUNTER — APPOINTMENT (OUTPATIENT)
Dept: OTHER | Facility: CLINIC | Age: 63
End: 2023-04-25
Payer: COMMERCIAL

## 2023-04-25 VITALS
HEIGHT: 72 IN | BODY MASS INDEX: 37.25 KG/M2 | OXYGEN SATURATION: 98 % | TEMPERATURE: 97.6 F | DIASTOLIC BLOOD PRESSURE: 92 MMHG | WEIGHT: 275 LBS | RESPIRATION RATE: 16 BRPM | SYSTOLIC BLOOD PRESSURE: 151 MMHG | HEART RATE: 61 BPM

## 2023-04-25 DIAGNOSIS — Z12.9 ENCOUNTER FOR SCREENING FOR MALIGNANT NEOPLASM, SITE UNSPECIFIED: ICD-10-CM

## 2023-04-25 PROCEDURE — 99213 OFFICE O/P EST LOW 20 MIN: CPT | Mod: 25

## 2023-04-25 PROCEDURE — 99396 PREV VISIT EST AGE 40-64: CPT | Mod: 25

## 2023-04-25 RX ORDER — PANTOPRAZOLE SODIUM 20 MG/1
TABLET, DELAYED RELEASE ORAL
Refills: 0 | Status: DISCONTINUED | COMMUNITY
End: 2023-04-25

## 2023-04-26 LAB
ALBUMIN SERPL ELPH-MCNC: 4.5 G/DL
ALP BLD-CCNC: 112 U/L
ALT SERPL-CCNC: 14 U/L
ANION GAP SERPL CALC-SCNC: 11 MMOL/L
APPEARANCE: CLEAR
AST SERPL-CCNC: 20 U/L
BACTERIA: NEGATIVE /HPF
BASOPHILS # BLD AUTO: 0.05 K/UL
BASOPHILS NFR BLD AUTO: 0.9 %
BILIRUB SERPL-MCNC: 0.4 MG/DL
BILIRUBIN URINE: ABNORMAL
BLOOD URINE: NEGATIVE
BUN SERPL-MCNC: 19 MG/DL
CALCIUM SERPL-MCNC: 9.9 MG/DL
CAST: 3 /LPF
CHLORIDE SERPL-SCNC: 104 MMOL/L
CHOLEST SERPL-MCNC: 191 MG/DL
CO2 SERPL-SCNC: 26 MMOL/L
COLOR: NORMAL
CREAT SERPL-MCNC: 0.74 MG/DL
EGFR: 102 ML/MIN/1.73M2
EOSINOPHIL # BLD AUTO: 0.23 K/UL
EOSINOPHIL NFR BLD AUTO: 4.2 %
EPITHELIAL CELLS: 3 /HPF
GLUCOSE QUALITATIVE U: NEGATIVE MG/DL
GLUCOSE SERPL-MCNC: 100 MG/DL
HCT VFR BLD CALC: 41.2 %
HDLC SERPL-MCNC: 79 MG/DL
HGB BLD-MCNC: 12.1 G/DL
IMM GRANULOCYTES NFR BLD AUTO: 0.4 %
KETONES URINE: ABNORMAL MG/DL
LDLC SERPL CALC-MCNC: 88 MG/DL
LEUKOCYTE ESTERASE URINE: ABNORMAL
LYMPHOCYTES # BLD AUTO: 1.42 K/UL
LYMPHOCYTES NFR BLD AUTO: 26.1 %
MAN DIFF?: NORMAL
MCHC RBC-ENTMCNC: 25.1 PG
MCHC RBC-ENTMCNC: 29.4 GM/DL
MCV RBC AUTO: 85.5 FL
MICROSCOPIC-UA: NORMAL
MONOCYTES # BLD AUTO: 0.55 K/UL
MONOCYTES NFR BLD AUTO: 10.1 %
NEUTROPHILS # BLD AUTO: 3.18 K/UL
NEUTROPHILS NFR BLD AUTO: 58.3 %
NITRITE URINE: NEGATIVE
NONHDLC SERPL-MCNC: 111 MG/DL
PH URINE: 5.5
PLATELET # BLD AUTO: 357 K/UL
POTASSIUM SERPL-SCNC: 4.7 MMOL/L
PROT SERPL-MCNC: 7.5 G/DL
PROTEIN URINE: NORMAL MG/DL
RBC # BLD: 4.82 M/UL
RBC # FLD: 18.6 %
RED BLOOD CELLS URINE: 2 /HPF
SODIUM SERPL-SCNC: 142 MMOL/L
SPECIFIC GRAVITY URINE: 1.03
TRIGL SERPL-MCNC: 114 MG/DL
UROBILINOGEN URINE: 1 MG/DL
WBC # FLD AUTO: 5.45 K/UL
WHITE BLOOD CELLS URINE: 3 /HPF

## 2023-05-02 ENCOUNTER — FORM ENCOUNTER (OUTPATIENT)
Age: 63
End: 2023-05-02

## 2023-06-04 ENCOUNTER — NON-APPOINTMENT (OUTPATIENT)
Age: 63
End: 2023-06-04

## 2023-06-04 VITALS — BODY MASS INDEX: 37.25 KG/M2 | WEIGHT: 275 LBS | HEIGHT: 72 IN

## 2023-06-04 DIAGNOSIS — F17.200 NICOTINE DEPENDENCE, UNSPECIFIED, UNCOMPLICATED: ICD-10-CM

## 2023-06-04 NOTE — HISTORY OF PRESENT ILLNESS
[Current] : Current [TextBox_13] : Patient is scheduled for a annual  LDCT for lung cancer screening. Referred by Dr. Chin. Chart review performed to confirm eligibility for LDCT. \par \par  No documented personal or family history of lung cancer. No documented s/s of lung cancer.\par Patient is a current smoker with a 27 pack year hx. [PacksperYear] : 27

## 2023-06-06 ENCOUNTER — OUTPATIENT (OUTPATIENT)
Dept: OUTPATIENT SERVICES | Facility: HOSPITAL | Age: 63
LOS: 1 days | End: 2023-06-06
Payer: COMMERCIAL

## 2023-06-06 ENCOUNTER — APPOINTMENT (OUTPATIENT)
Dept: CT IMAGING | Facility: IMAGING CENTER | Age: 63
End: 2023-06-06
Payer: COMMERCIAL

## 2023-06-06 DIAGNOSIS — Z12.9 ENCOUNTER FOR SCREENING FOR MALIGNANT NEOPLASM, SITE UNSPECIFIED: ICD-10-CM

## 2023-06-06 DIAGNOSIS — Z00.8 ENCOUNTER FOR OTHER GENERAL EXAMINATION: ICD-10-CM

## 2023-06-06 PROCEDURE — 71271 CT THORAX LUNG CANCER SCR C-: CPT

## 2023-06-06 PROCEDURE — 71271 CT THORAX LUNG CANCER SCR C-: CPT | Mod: 26

## 2024-05-02 ENCOUNTER — APPOINTMENT (OUTPATIENT)
Dept: OTHER | Facility: CLINIC | Age: 64
End: 2024-05-02
Payer: COMMERCIAL

## 2024-05-02 VITALS
OXYGEN SATURATION: 98 % | DIASTOLIC BLOOD PRESSURE: 87 MMHG | HEIGHT: 72 IN | RESPIRATION RATE: 16 BRPM | TEMPERATURE: 98 F | HEART RATE: 65 BPM | SYSTOLIC BLOOD PRESSURE: 143 MMHG | BODY MASS INDEX: 33.18 KG/M2 | WEIGHT: 245 LBS

## 2024-05-02 DIAGNOSIS — C61 MALIGNANT NEOPLASM OF PROSTATE: ICD-10-CM

## 2024-05-02 DIAGNOSIS — Z04.9 ENCOUNTER FOR EXAMINATION AND OBSERVATION FOR UNSPECIFIED REASON: ICD-10-CM

## 2024-05-02 PROCEDURE — 99213 OFFICE O/P EST LOW 20 MIN: CPT | Mod: 25

## 2024-05-02 PROCEDURE — 99396 PREV VISIT EST AGE 40-64: CPT | Mod: 25

## 2024-05-02 PROCEDURE — 94010 BREATHING CAPACITY TEST: CPT

## 2024-05-02 RX ORDER — SEMAGLUTIDE 1.34 MG/ML
2 INJECTION, SOLUTION SUBCUTANEOUS
Refills: 0 | Status: ACTIVE | COMMUNITY

## 2024-05-02 NOTE — DISCUSSION/SUMMARY
[FreeTextEntry3] : HPI  63 y M presents to [facility] for his  3rd annual health exam.  Prostate Cancer Pt is following urologist every 6 months and he also follows at [facility] with oncology cyberknife    Pt reports he quit smoking better breathing about a year ago   Sinus infections occasionally   Pt reports to have heartburn  since 05/2003 s/p gastric surgery had Endoscopy/Gastroscopy was started on pantoprazole this past year for bleeding ulcer. 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   Results: Imaging: LDCT Spirometry:  done today   A/P:  -LDCT, CBC, CMP, lipids, UA ordered -prostate ca- onc f/u CM   -gerd related to gastric bypass surgery   -RTC 1 year

## 2024-05-02 NOTE — HISTORY OF PRESENT ILLNESS
[FreeTextEntry1] : HPI 63 y M presents to [facility] for his 3rd annual health exam.  Prostate Cancer Pt is following urologist every 6 months and he also follows at [facility] with oncology - darrian jones 1.5 years left for follow ups as per pt   Pt reports he quit smoking better breathing about a year ago  Sinus infections occasionally  Pt reports to have heartburn since 05/2003 s/p gastric surgery had Endoscopy/Gastroscopy was started on pantoprazole this past year for bleeding ulcer. he reports before than he was taking tums like candy - He takes protonix  Pt is using Ozempic for weightloss

## 2024-05-02 NOTE — PHYSICAL EXAM
[Neck Appearance] : the appearance of the neck was normal [] : no respiratory distress [Respiration, Rhythm And Depth] : normal respiratory rhythm and effort [Heart Sounds] : normal S1 and S2 [Edema] : there was no peripheral edema [Bowel Sounds] : normal bowel sounds [Abdomen Soft] : soft [Abnormal Walk] : normal gait [Oriented To Time, Place, And Person] : oriented to person, place, and time

## 2024-05-02 NOTE — REVIEW OF SYSTEMS
Health Maintenance Due   Topic Date Due    COVID-19 Vaccine (1) Never done    Influenza Vaccine (1) 09/01/2023    Annual Physical (ages 3-18)  02/28/2024       Patient is due for topics listed above, he wishes to proceed with Annual Wellness Visit (ages 3-18), but is not proceeding with Immunization(s) COVID-19 and Influenza at this time. The following has occurred: Mom refused covid-19 and flu.   [Wheezing] : wheezing [Heartburn] : heartburn [Nasal Discharge] : no nasal discharge [Chest Pain] : no chest pain [Palpitations] : no palpitations [Cough] : no cough [SOB on Exertion] : no shortness of breath during exertion [Dysuria] : no dysuria [Joint Pain] : no joint pain [Skin Lesions] : no skin lesions

## 2024-05-02 NOTE — PAST MEDICAL HISTORY
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

## 2024-05-02 NOTE — PAST MEDICAL HISTORY
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

## 2024-05-02 NOTE — HISTORY OF PRESENT ILLNESS
[FreeTextEntry1] : HPI 63 y M presents to [facility] for his 3rd annual health exam.  Prostate Cancer Pt is following urologist every 6 months and he also follows at [facility] with oncology - darrian jones 1.5 years left for follow ups as per pt   Pt reports he quit smoking better breathing about a year ago  Sinus infections occasionally  Pt reports to have heartburn since 05/2003 s/p gastric surgery had Endoscopy/Gastroscopy was started on pantoprazole this past year for bleeding ulcer. he reports before than he was taking tums like candy - He takes protonix  Pt is using Ozempic for weightloss

## 2024-05-02 NOTE — ASSESSMENT
[FreeTextEntry1] :  A/P: -LDCT, PFT, CBC, CMP, lipids, UA ordered -prostate ca- onc f/u CM -gerd related to gastric bypass surgery -RTC 1 year.

## 2024-05-02 NOTE — REVIEW OF SYSTEMS
[Wheezing] : wheezing [Heartburn] : heartburn [Nasal Discharge] : no nasal discharge [Chest Pain] : no chest pain [Palpitations] : no palpitations [Cough] : no cough [SOB on Exertion] : no shortness of breath during exertion [Dysuria] : no dysuria [Joint Pain] : no joint pain [Skin Lesions] : no skin lesions

## 2024-05-02 NOTE — DISCUSSION/SUMMARY
[FreeTextEntry3] : HPI  63 y M presents to [facility] for his  3rd annual health exam.  Prostate Cancer Pt is following urologist every 6 months and he also follows at [facility] with oncology cyberknife    Pt reports he quit smoking better breathing about a year ago   Sinus infections occasionally   Pt reports to have heartburn  since 05/2003 s/p gastric surgery had Endoscopy/Gastroscopy was started on pantoprazole this past year for bleeding ulcer. 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   Results: Imaging: LDCT Spirometry:  done today   A/P:  -LDCT, CBC, CMP, lipids, UA ordered -prostate ca- onc f/u CM   -gerd related to gastric bypass surgery   -RTC 1 year

## 2024-05-06 LAB
ALBUMIN SERPL ELPH-MCNC: 4.3 G/DL
ALP BLD-CCNC: 120 U/L
ALT SERPL-CCNC: 14 U/L
ANION GAP SERPL CALC-SCNC: 11 MMOL/L
APPEARANCE: CLEAR
AST SERPL-CCNC: 20 U/L
BACTERIA: NEGATIVE /HPF
BASOPHILS # BLD AUTO: 0.07 K/UL
BASOPHILS NFR BLD AUTO: 1.2 %
BILIRUB SERPL-MCNC: 0.4 MG/DL
BILIRUBIN URINE: NEGATIVE
BLOOD URINE: NEGATIVE
BUN SERPL-MCNC: 21 MG/DL
CALCIUM OXALATE CRYSTALS: PRESENT
CALCIUM SERPL-MCNC: 9.4 MG/DL
CAST: 1 /LPF
CHLORIDE SERPL-SCNC: 101 MMOL/L
CHOLEST SERPL-MCNC: 165 MG/DL
CO2 SERPL-SCNC: 26 MMOL/L
COLOR: NORMAL
CREAT SERPL-MCNC: 0.85 MG/DL
EGFR: 98 ML/MIN/1.73M2
EOSINOPHIL # BLD AUTO: 0.29 K/UL
EOSINOPHIL NFR BLD AUTO: 4.9 %
EPITHELIAL CELLS: 1 /HPF
GLUCOSE QUALITATIVE U: NEGATIVE MG/DL
GLUCOSE SERPL-MCNC: 86 MG/DL
HCT VFR BLD CALC: 43.6 %
HDLC SERPL-MCNC: 74 MG/DL
HGB BLD-MCNC: 13.1 G/DL
IMM GRANULOCYTES NFR BLD AUTO: 0.2 %
KETONES URINE: NEGATIVE MG/DL
LDLC SERPL CALC-MCNC: 77 MG/DL
LEUKOCYTE ESTERASE URINE: NEGATIVE
LYMPHOCYTES # BLD AUTO: 1.52 K/UL
LYMPHOCYTES NFR BLD AUTO: 25.6 %
MAN DIFF?: NORMAL
MCHC RBC-ENTMCNC: 26.8 PG
MCHC RBC-ENTMCNC: 30 GM/DL
MCV RBC AUTO: 89.2 FL
MICROSCOPIC-UA: NORMAL
MONOCYTES # BLD AUTO: 0.71 K/UL
MONOCYTES NFR BLD AUTO: 12 %
NEUTROPHILS # BLD AUTO: 3.34 K/UL
NEUTROPHILS NFR BLD AUTO: 56.1 %
NITRITE URINE: NEGATIVE
NONHDLC SERPL-MCNC: 90 MG/DL
PH URINE: 6
PLATELET # BLD AUTO: 342 K/UL
POTASSIUM SERPL-SCNC: 4.9 MMOL/L
PROT SERPL-MCNC: 7.7 G/DL
PROTEIN URINE: NEGATIVE MG/DL
RBC # BLD: 4.89 M/UL
RBC # FLD: 16.7 %
RED BLOOD CELLS URINE: 3 /HPF
SODIUM SERPL-SCNC: 139 MMOL/L
SPECIFIC GRAVITY URINE: 1.02
TRIGL SERPL-MCNC: 70 MG/DL
UROBILINOGEN URINE: 1 MG/DL
WBC # FLD AUTO: 5.94 K/UL
WHITE BLOOD CELLS URINE: 0 /HPF

## 2024-05-10 ENCOUNTER — NON-APPOINTMENT (OUTPATIENT)
Age: 64
End: 2024-05-10

## 2024-05-10 VITALS — HEIGHT: 72 IN | WEIGHT: 245 LBS | BODY MASS INDEX: 33.18 KG/M2

## 2024-05-10 DIAGNOSIS — Z87.891 PERSONAL HISTORY OF NICOTINE DEPENDENCE: ICD-10-CM

## 2024-05-10 NOTE — HISTORY OF PRESENT ILLNESS
[Current] : Current [TextBox_13] : Patient is scheduled for an annual LDCT for lung cancer screening. Referred by Dr. Chin. Chart review performed to confirm eligibility for LDCT.    No documented personal or family history of lung cancer. No documented s/s of lung cancer. Patient is a former smoker with a 27 pack year hx. quit Nov 2022  [YearQuit] : 2022 [PacksperYear] : 27

## 2024-06-10 ENCOUNTER — OUTPATIENT (OUTPATIENT)
Dept: OUTPATIENT SERVICES | Facility: HOSPITAL | Age: 64
LOS: 1 days | End: 2024-06-10
Payer: COMMERCIAL

## 2024-06-10 ENCOUNTER — APPOINTMENT (OUTPATIENT)
Dept: CT IMAGING | Facility: IMAGING CENTER | Age: 64
End: 2024-06-10
Payer: COMMERCIAL

## 2024-06-10 DIAGNOSIS — Z12.2 ENCOUNTER FOR SCREENING FOR MALIGNANT NEOPLASM OF RESPIRATORY ORGANS: ICD-10-CM

## 2024-06-10 PROCEDURE — 71271 CT THORAX LUNG CANCER SCR C-: CPT | Mod: 26

## 2024-06-10 PROCEDURE — 71271 CT THORAX LUNG CANCER SCR C-: CPT

## 2024-07-23 ENCOUNTER — NON-APPOINTMENT (OUTPATIENT)
Age: 64
End: 2024-07-23

## 2024-11-07 ENCOUNTER — NON-APPOINTMENT (OUTPATIENT)
Age: 64
End: 2024-11-07

## 2025-04-26 ENCOUNTER — NON-APPOINTMENT (OUTPATIENT)
Age: 65
End: 2025-04-26

## 2025-04-28 ENCOUNTER — APPOINTMENT (OUTPATIENT)
Dept: OTHER | Facility: CLINIC | Age: 65
End: 2025-04-28
Payer: COMMERCIAL

## 2025-04-28 VITALS
TEMPERATURE: 99.1 F | SYSTOLIC BLOOD PRESSURE: 124 MMHG | HEIGHT: 72 IN | WEIGHT: 230 LBS | OXYGEN SATURATION: 95 % | HEART RATE: 77 BPM | BODY MASS INDEX: 31.15 KG/M2 | RESPIRATION RATE: 18 BRPM | DIASTOLIC BLOOD PRESSURE: 75 MMHG

## 2025-04-28 DIAGNOSIS — C61 MALIGNANT NEOPLASM OF PROSTATE: ICD-10-CM

## 2025-04-28 DIAGNOSIS — Z04.9 ENCOUNTER FOR EXAMINATION AND OBSERVATION FOR UNSPECIFIED REASON: ICD-10-CM

## 2025-04-28 PROCEDURE — 99214 OFFICE O/P EST MOD 30 MIN: CPT | Mod: 25

## 2025-04-28 PROCEDURE — 94010 BREATHING CAPACITY TEST: CPT

## 2025-04-28 PROCEDURE — 99396 PREV VISIT EST AGE 40-64: CPT | Mod: 25

## 2025-04-29 LAB
ALBUMIN SERPL ELPH-MCNC: 4.1 G/DL
ALP BLD-CCNC: 103 U/L
ALT SERPL-CCNC: 20 U/L
ANION GAP SERPL CALC-SCNC: 15 MMOL/L
APPEARANCE: CLEAR
AST SERPL-CCNC: 24 U/L
BACTERIA: NEGATIVE /HPF
BASOPHILS # BLD AUTO: 0.06 K/UL
BASOPHILS NFR BLD AUTO: 0.9 %
BILIRUB SERPL-MCNC: 0.3 MG/DL
BILIRUBIN URINE: NEGATIVE
BLOOD URINE: NEGATIVE
BUN SERPL-MCNC: 18 MG/DL
CALCIUM SERPL-MCNC: 9.2 MG/DL
CAST: 2 /LPF
CHLORIDE SERPL-SCNC: 104 MMOL/L
CHOLEST SERPL-MCNC: 180 MG/DL
CO2 SERPL-SCNC: 23 MMOL/L
COLOR: YELLOW
CREAT SERPL-MCNC: 0.89 MG/DL
EGFRCR SERPLBLD CKD-EPI 2021: 96 ML/MIN/1.73M2
EOSINOPHIL # BLD AUTO: 0.28 K/UL
EOSINOPHIL NFR BLD AUTO: 4.4 %
EPITHELIAL CELLS: 1 /HPF
GLUCOSE QUALITATIVE U: NEGATIVE MG/DL
GLUCOSE SERPL-MCNC: 58 MG/DL
HCT VFR BLD CALC: 44.6 %
HDLC SERPL-MCNC: 76 MG/DL
HGB BLD-MCNC: 13.7 G/DL
IMM GRANULOCYTES NFR BLD AUTO: 0.3 %
KETONES URINE: NEGATIVE MG/DL
LDLC SERPL-MCNC: 86 MG/DL
LEUKOCYTE ESTERASE URINE: ABNORMAL
LYMPHOCYTES # BLD AUTO: 1.21 K/UL
LYMPHOCYTES NFR BLD AUTO: 19.1 %
MAN DIFF?: NORMAL
MCHC RBC-ENTMCNC: 27.7 PG
MCHC RBC-ENTMCNC: 30.7 G/DL
MCV RBC AUTO: 90.1 FL
MICROSCOPIC-UA: NORMAL
MONOCYTES # BLD AUTO: 0.73 K/UL
MONOCYTES NFR BLD AUTO: 11.5 %
NEUTROPHILS # BLD AUTO: 4.04 K/UL
NEUTROPHILS NFR BLD AUTO: 63.8 %
NITRITE URINE: NEGATIVE
NONHDLC SERPL-MCNC: 105 MG/DL
PH URINE: 6
PLATELET # BLD AUTO: 343 K/UL
POTASSIUM SERPL-SCNC: 4.6 MMOL/L
PROT SERPL-MCNC: 7.1 G/DL
PROTEIN URINE: NEGATIVE MG/DL
RBC # BLD: 4.95 M/UL
RBC # FLD: 15.4 %
RED BLOOD CELLS URINE: 1 /HPF
SODIUM SERPL-SCNC: 141 MMOL/L
SPECIFIC GRAVITY URINE: 1.02
TRIGL SERPL-MCNC: 107 MG/DL
UROBILINOGEN URINE: 1 MG/DL
WBC # FLD AUTO: 6.34 K/UL
WHITE BLOOD CELLS URINE: 1 /HPF

## 2025-06-10 ENCOUNTER — NON-APPOINTMENT (OUTPATIENT)
Age: 65
End: 2025-06-10

## 2025-06-10 VITALS — WEIGHT: 230 LBS | HEIGHT: 72 IN | BODY MASS INDEX: 31.15 KG/M2

## 2025-06-11 ENCOUNTER — OUTPATIENT (OUTPATIENT)
Dept: OUTPATIENT SERVICES | Facility: HOSPITAL | Age: 65
LOS: 1 days | End: 2025-06-11
Payer: COMMERCIAL

## 2025-06-11 ENCOUNTER — APPOINTMENT (OUTPATIENT)
Dept: CT IMAGING | Facility: IMAGING CENTER | Age: 65
End: 2025-06-11
Payer: COMMERCIAL

## 2025-06-11 DIAGNOSIS — Z12.2 ENCOUNTER FOR SCREENING FOR MALIGNANT NEOPLASM OF RESPIRATORY ORGANS: ICD-10-CM

## 2025-06-11 PROCEDURE — 71271 CT THORAX LUNG CANCER SCR C-: CPT | Mod: 26

## 2025-06-11 PROCEDURE — 71271 CT THORAX LUNG CANCER SCR C-: CPT

## 2025-07-07 ENCOUNTER — NON-APPOINTMENT (OUTPATIENT)
Age: 65
End: 2025-07-07

## 2025-08-14 ENCOUNTER — NON-APPOINTMENT (OUTPATIENT)
Age: 65
End: 2025-08-14

## (undated) DEVICE — WARMING BLANKET UPPER ADULT

## (undated) DEVICE — VENODYNE/SCD SLEEVE CALF MEDIUM

## (undated) DEVICE — NDL BIOPSY CHIBA 22G X 20CM

## (undated) DEVICE — DRSG TELFA 3 X 8

## (undated) DEVICE — GRID BRACHYTHERAPY EZ 18G

## (undated) DEVICE — BALLOON ENDOCAVITY 2X14CM

## (undated) DEVICE — PREP BETADINE SPONGE STICKS

## (undated) DEVICE — DRAPE TOWEL BLUE 17" X 24"

## (undated) DEVICE — NDL HYPO REGULAR BEVEL 25G X 1.5" (BLUE)

## (undated) DEVICE — DRAPE MEDIUM SHEET 44" X 70"

## (undated) DEVICE — NDL MAX CORE 18G X 25CM

## (undated) DEVICE — GLV 7.5 PROTEXIS (WHITE)

## (undated) DEVICE — SYR LUER LOK 50CC

## (undated) DEVICE — SYR LUER LOK 10CC

## (undated) DEVICE — PLASTIC SOLUTION BOWL 160Z

## (undated) DEVICE — SYR CATH TIP 2 OZ